# Patient Record
Sex: FEMALE | URBAN - METROPOLITAN AREA
[De-identification: names, ages, dates, MRNs, and addresses within clinical notes are randomized per-mention and may not be internally consistent; named-entity substitution may affect disease eponyms.]

---

## 2017-11-21 ENCOUNTER — HISTORICAL (OUTPATIENT)
Dept: RADIOLOGY | Facility: HOSPITAL | Age: 27
End: 2017-11-21

## 2017-12-14 ENCOUNTER — HISTORICAL (OUTPATIENT)
Dept: RADIOLOGY | Facility: HOSPITAL | Age: 27
End: 2017-12-14

## 2018-06-06 ENCOUNTER — HISTORICAL (OUTPATIENT)
Dept: PREADMISSION TESTING | Facility: HOSPITAL | Age: 28
End: 2018-06-06

## 2018-06-06 LAB
ABS NEUT (OLG): 7.7 X10(3)/MCL (ref 2.1–9.2)
ALBUMIN SERPL-MCNC: 3.2 GM/DL (ref 3.4–5)
ALBUMIN/GLOB SERPL: 0.8 RATIO (ref 1.1–2)
ALP SERPL-CCNC: 50 UNIT/L (ref 38–126)
ALT SERPL-CCNC: 80 UNIT/L (ref 12–78)
AST SERPL-CCNC: 27 UNIT/L (ref 15–37)
BASOPHILS # BLD AUTO: 0 X10(3)/MCL (ref 0–0.2)
BASOPHILS NFR BLD AUTO: 0 %
BILIRUB SERPL-MCNC: 0.1 MG/DL (ref 0.2–1)
BILIRUBIN DIRECT+TOT PNL SERPL-MCNC: 0 MG/DL (ref 0–0.8)
BILIRUBIN DIRECT+TOT PNL SERPL-MCNC: 0.1 MG/DL (ref 0–0.5)
BUN SERPL-MCNC: 14 MG/DL (ref 7–18)
CALCIUM SERPL-MCNC: 8.5 MG/DL (ref 8.5–10.1)
CHLORIDE SERPL-SCNC: 105 MMOL/L (ref 98–107)
CO2 SERPL-SCNC: 25 MMOL/L (ref 21–32)
CREAT SERPL-MCNC: 0.74 MG/DL (ref 0.55–1.02)
EOSINOPHIL # BLD AUTO: 0.1 X10(3)/MCL (ref 0–0.9)
EOSINOPHIL NFR BLD AUTO: 1 %
ERYTHROCYTE [DISTWIDTH] IN BLOOD BY AUTOMATED COUNT: 15 % (ref 11.5–17)
GLOBULIN SER-MCNC: 4.2 GM/DL (ref 2.4–3.5)
GLUCOSE SERPL-MCNC: 79 MG/DL (ref 74–106)
HCT VFR BLD AUTO: 36.2 % (ref 37–47)
HGB BLD-MCNC: 11.1 GM/DL (ref 12–16)
LYMPHOCYTES # BLD AUTO: 2.4 X10(3)/MCL (ref 0.6–4.6)
LYMPHOCYTES NFR BLD AUTO: 22 %
MCH RBC QN AUTO: 26.2 PG (ref 27–31)
MCHC RBC AUTO-ENTMCNC: 30.7 GM/DL (ref 33–36)
MCV RBC AUTO: 85.6 FL (ref 80–94)
MONOCYTES # BLD AUTO: 0.9 X10(3)/MCL (ref 0.1–1.3)
MONOCYTES NFR BLD AUTO: 8 %
NEUTROPHILS # BLD AUTO: 7.7 X10(3)/MCL (ref 2.1–9.2)
NEUTROPHILS NFR BLD AUTO: 69 %
PLATELET # BLD AUTO: 208 X10(3)/MCL (ref 130–400)
PMV BLD AUTO: 12.3 FL (ref 9.4–12.4)
POTASSIUM SERPL-SCNC: 4.3 MMOL/L (ref 3.5–5.1)
PROT SERPL-MCNC: 7.4 GM/DL (ref 6.4–8.2)
RBC # BLD AUTO: 4.23 X10(6)/MCL (ref 4.2–5.4)
SODIUM SERPL-SCNC: 135 MMOL/L (ref 136–145)
WBC # SPEC AUTO: 11.2 X10(3)/MCL (ref 4.5–11.5)

## 2018-06-20 ENCOUNTER — HISTORICAL (OUTPATIENT)
Dept: SURGERY | Facility: HOSPITAL | Age: 28
End: 2018-06-20

## 2018-08-31 LAB — POC BETA-HCG (QUAL): NEGATIVE

## 2022-04-10 ENCOUNTER — HISTORICAL (OUTPATIENT)
Dept: ADMINISTRATIVE | Facility: HOSPITAL | Age: 32
End: 2022-04-10

## 2022-04-28 VITALS
BODY MASS INDEX: 35 KG/M2 | SYSTOLIC BLOOD PRESSURE: 119 MMHG | WEIGHT: 205 LBS | DIASTOLIC BLOOD PRESSURE: 74 MMHG | OXYGEN SATURATION: 98 % | HEIGHT: 64 IN

## 2022-04-30 NOTE — OP NOTE
DATE OF SURGERY:    06/20/2018    SURGEON:  Rene Adams MD    PREOPERATIVE DIAGNOSIS:  Chronic gallbladder disease with sludge (cholelithiasis).    POSTOPERATIVE DIAGNOSIS:  Chronic gallbladder disease with sludge (cholelithiasis).    PROCEDURE:  Laparoscopic cholecystectomy.    PROCEDURE IN DETAIL:  The patient was placed on the operating table in supine position and placed under general endotracheal anesthesia. The  abdomen was prepped and draped in the usual sterile fashion using ChloraPrep and sterile drapes.  A small infraumbilical vertically oriented incision was performed through skin, subcutaneous tissues and down to the fascia after having obtained appropriate timeout.  The fascia was incised and the peritoneum carefully opened and a blunt trocar inserted into the peritoneal cavity and secured to the fascia with a stay suture of 0 Vicryl.  The abdomen was insufflated to 15 mmHg using CO2 gas.  Our scope was introduced and under direct vision, our epigastric right subcostal and right lateral ports were inserted.  The gallbladder was mildly distended, and contained no significant adhesive disease.  Duodenum appeared grossly normal.  Colon was somewhat distended with gas.  The patient was placed into reverse Trendelenburg position and rotated slightly to the left while the apex of the gallbladder was grasped with one grasper and the infundibulum portion of the gallbladder grasped with a second grasper.  Careful dissection in the region of the triangle of Calot was performed removing the peritoneal tissues, which in this site were more dense due to chronic cholecystitis.  The infundibulum edge was seen reaching the origin of the cystic duct.  The cystic duct entry  distally into the common bile duct was visualized.  The cystic duct was then dissected out. The cystic artery seen entering the anteromedial wall of the body of gallbladder, had a Y shaped branching anatomy,  and the main trunk was somewhat  distal, so I felt it best to take both branches separately,  placing two clips on the proximal sides of each branch and single clips on the gallbladder side and divided the artery.  The cystic duct was doubly clipped distally on the common duct side and singly on the neck of the gallbladder side and divided.  The gallbladder was placed on gentle traction and dissected from the gallbladder fossa using the electrocautery unit.  A posterior branch of the artery was taken and clipped singly and divided and remaining segment of the gallbladder removed all the way up to the final apical attachment making sure we had adequate hemostasis within the gallbladder fossa before taking down the final apical attachment.  Once this was accomplished, the gallbladder was easily removed through the epigastric port.  Reinspection of our area of dissection revealed the gallbladder fossa dry with adequate hemostasis.  Our clipped arteries were visualized, as well as the cystic duct,  and there was no sign of bilious or bloody ooze.  Blood loss was negligible.  CO2 and ports were removed and the fascial defects closed with a figure-of-eight suture of 0 Vicryl and the fascia at the subxiphoid and umbilical sites injected using a total of 10 mL of 0.25% Marcaine with epinephrine.  At this point, 3-0 Vicryl was placed in the subcutaneous tissues of the epigastric and subxiphoid incisions,  and the skin at all sites closed with a running subcuticular of 4-0 Vicryl completing the procedure.  Dressings were applied.  The patient having tolerated the procedure well was awakened and returned to the recovery room in stable condition.        ______________________________  MD TANIA Rausch/CHARITY  DD:  06/20/2018  Time:  11:12AM  DT:  06/21/2018  Time:  07:20AM  Job #:  [id number]